# Patient Record
Sex: MALE | URBAN - METROPOLITAN AREA
[De-identification: names, ages, dates, MRNs, and addresses within clinical notes are randomized per-mention and may not be internally consistent; named-entity substitution may affect disease eponyms.]

---

## 2020-08-04 ENCOUNTER — HOSPITAL ENCOUNTER (OUTPATIENT)
Dept: SLEEP CENTER | Age: 40
Discharge: HOME | End: 2020-08-04
Payer: COMMERCIAL

## 2020-08-04 PROCEDURE — 94770 HC ETCO2 MONITOR DAILY: CPT

## 2020-08-04 PROCEDURE — 95810 POLYSOM 6/> YRS 4/> PARAM: CPT

## 2020-08-05 VITALS
BODY MASS INDEX: 34.3 KG/M2 | OXYGEN SATURATION: 95 % | HEART RATE: 58 BPM | WEIGHT: 245 LBS | SYSTOLIC BLOOD PRESSURE: 135 MMHG | HEIGHT: 71 IN | TEMPERATURE: 97.5 F | DIASTOLIC BLOOD PRESSURE: 83 MMHG

## 2020-08-05 ASSESSMENT — SLEEP AND FATIGUE QUESTIONNAIRES
HOW LIKELY ARE YOU TO NOD OFF OR FALL ASLEEP WHILE SITTING INACTIVE IN A PUBLIC PLACE: 1
HOW LIKELY ARE YOU TO NOD OFF OR FALL ASLEEP WHILE SITTING AND READING: 3
HOW LIKELY ARE YOU TO NOD OFF OR FALL ASLEEP WHILE WATCHING TV: 3
HOW LIKELY ARE YOU TO NOD OFF OR FALL ASLEEP WHEN YOU ARE A PASSENGER IN A CAR FOR AN HOUR WITHOUT A BREAK: 3
HOW LIKELY ARE YOU TO NOD OFF OR FALL ASLEEP WHILE SITTING AND TALKING TO SOMEONE: 1
HOW LIKELY ARE YOU TO NOD OFF OR FALL ASLEEP WHILE SITTING QUIETLY AFTER LUNCH WITHOUT ALCOHOL: 1
HOW LIKELY ARE YOU TO NOD OFF OR FALL ASLEEP IN A CAR, WHILE STOPPED FOR A FEW MINUTES IN TRAFFIC: 1
ESS TOTAL SCORE: 15
HOW LIKELY ARE YOU TO NOD OFF OR FALL ASLEEP WHILE LYING DOWN TO REST IN THE AFTERNOON WHEN CIRCUMSTANCES PERMIT: 2

## 2020-08-17 NOTE — PROGRESS NOTES
[id number] [address]                               SLEEP STUDY REPORT    PATIENT NAME: Mago Martinez                      :        1980  MED REC NO:   [id number]                            ROOM:  ACCOUNT NO:   [de-identified]                           ADMIT DATE: 2020  PROVIDER:     Sophie Pena    DATE OF STUDY:  2020    DIAGNOSTIC POLYSOMNOGRAM WITH END-TIDAL CO2 MONITORING    Nocturnal polysomnography was undertaken on the evening of 2020  through the morning of 2020 utilizing a standard six EEG sleep  apnea protocol. Additional recorded channels included chin EMG,  extraocular movements, snoring, pulse oximetry, electrocardiogram,  airflow at nose and mouth, respiratory effort at ribcage and abdomen,  end-tidal CO2, limb EMG, and body position was recorded with closed  circuit video monitoring. INDICATION FOR STUDY:  Snoring, restless sleep, morning headaches,  drowsy driving, trouble with memory and concentration, and obesity with  hypoventilation. [place] Sleepiness Scale is 15/24 and BMI is 34.2. Total recording time was 386 minutes. Total sleep time of 374 minutes  or 97% sleep efficiency. Sleep latency was markedly decreased at less  than 1 minute. Wake after sleep onset was normal at 11.5 minutes. REM  latency was normal at 73 minutes. Stage I sleep was slightly reduced at  3% of total sleep time. Stage II sleep was increased at 79% of total  sleep time. Delta sleep was normal at 14% of total sleep time and REM  stage sleep was reduced at 4% of total sleep time. RESPIRATORY DISTURBANCE ANALYSIS:  Hypopneas were determined with a 3%  AASM desaturation rule. There were 4 central apneas with mean duration  of 12 seconds and maximal duration of 13 seconds. There were 49  hypopneas with mean duration of 17 seconds and maximal duration of 35  seconds.   Apnea-hypopnea index was 8.5 per hour overall and 32.7 per  hour during REM sleep. PERIODIC LIMB MOVEMENT ANALYSIS:  There were 16 limb movements without  associated arousals for a limb movement index of 2.6 per hour. No PLM  series were observed. DESATURATION ANALYSIS:  There were 53 desaturations of 3% or more for a  desaturation index of 8.5 per hour. END-TIDAL CO2 ANALYSIS:  Mean end-tidal CO2 for this study was 27.5 with  minimum of 18 and maximum of 32. PULSE RATE ANALYSIS:  Mean pulse rate for this study was 51.1 with  minimum of 43 and maximum of 80. No significant ectopy was noted. SLEEP ARCHITECTURE:  Sleep latency was very short. Delta latency was  normal.  REM latency was normal and REM cycled as expected, but all REM  cycles were very short. There were 13 awakenings, all of which were  short. IMPRESSION:  1. Diagnostic polysomnogram is compatible with mild obstructive sleep  apnea. 2.  There was no evidence of active hypoventilation on the present  study. 3.  Mild-to-moderate desaturations were observed in association with  respiratory events. 4.  There is a relative paucity of REM sleep. Sleep is otherwise fairly  well consolidated. RECOMMENDATIONS:  1. CPAP/bilevel titration. 2.  Avoid driving when sleepy. 3.  Avoid use of alcohol, sedatives or narcotics, any of which may make  sleep apnea worse. 4.  Further weight loss is recommended.         Libia Yo      D: 08/16/2020 15:21:53       T: 08/17/2020 5:28:38     [email or web address]  Job#: [id number]     Doc#: [id number]    CC:

## 2020-11-20 DIAGNOSIS — G47.33 OBSTRUCTIVE SLEEP APNEA (ADULT) (PEDIATRIC): Primary | ICD-10-CM

## 2020-12-08 ENCOUNTER — HOSPITAL ENCOUNTER (OUTPATIENT)
Age: 40
Discharge: HOME | End: 2020-12-10
Payer: COMMERCIAL

## 2020-12-08 PROCEDURE — U0003 INFECTIOUS AGENT DETECTION BY NUCLEIC ACID (DNA OR RNA); SEVERE ACUTE RESPIRATORY SYNDROME CORONAVIRUS 2 (SARS-COV-2) (CORONAVIRUS DISEASE [COVID-19]), AMPLIFIED PROBE TECHNIQUE, MAKING USE OF HIGH THROUGHPUT TECHNOLOGIES AS DESCRIBED BY CMS-2020-01-R: HCPCS

## 2020-12-10 LAB
SARS-COV-2: NOT DETECTED
SOURCE: NORMAL

## 2020-12-13 ENCOUNTER — HOSPITAL ENCOUNTER (OUTPATIENT)
Dept: SLEEP CENTER | Age: 40
Discharge: HOME | End: 2020-12-13
Payer: COMMERCIAL

## 2020-12-13 PROCEDURE — 95811 POLYSOM 6/>YRS CPAP 4/> PARM: CPT

## 2020-12-14 VITALS
HEART RATE: 53 BPM | BODY MASS INDEX: 34.3 KG/M2 | DIASTOLIC BLOOD PRESSURE: 82 MMHG | OXYGEN SATURATION: 98 % | WEIGHT: 245 LBS | HEIGHT: 71 IN | SYSTOLIC BLOOD PRESSURE: 141 MMHG | TEMPERATURE: 97.6 F

## 2020-12-14 RX ORDER — OMEPRAZOLE 10 MG/1
10 CAPSULE, DELAYED RELEASE ORAL DAILY
COMMUNITY

## 2020-12-17 NOTE — PROGRESS NOTES
[address] [address]                               SLEEP STUDY REPORT    PATIENT NAME: Ham Barry                      :        1980  MED REC NO:   [id number]                            ROOM:  ACCOUNT NO:   [de-identified]                           ADMIT DATE: 2020  PROVIDER:     Manuel Hardy MD    DATE OF STUDY:  2020    CPAP TITRATION    This is a patient of Dr. Maame Taylor. AGE OF THE PATIENT:  42-year-old. Height 71 inches, weighs 245 pounds. BMI is 34.2. INDICATION:  The patient has a history of obstructive sleep apnea. Previous study was done on 2020 with an apnea-hypopnea index of  8.5. REM index of 32.7 with lowest saturation of 89%. The patient  scores 15/24 on the [place] Sleepiness Scale. Neck circumference is  15.5. REPORT IN DETAIL:  Nocturnal polysomnography was done utilizing a  standard six EEG sleep apnea protocol. Raw data was reviewed in detail. Total recording time was 261 minutes, out of which total sleep time was  333 minutes resulting in a sleep efficiency of 92%. The patient had a  sleep latency of 2.3 minutes and was awake for another 26 minutes after  sleep onset. The patient spent 5% in stage I sleep, 84% in stage II  sleep, there was no delta sleep and 11% in REM sleep. REM latency was  131 minuets. The patient had quick sleep onset and did have very good  sleep efficiency for this test.    SLEEP-DISORDERED BREATHING:  The patient had 32 events during the night,  which consisted of 6 central apneas, 3 obstructive apneas, and 1 mixed  apnea. This accounts for an apnea index of 1.8. The patient had 22  hypopneas for a hypopnea index of 4. Total apnea-hypopnea index was  6.1. These events lasted anywhere from 17 seconds to 33 seconds in  duration. REM index was elevated at 6.3.   This was with 3% desaturation. With 4% desaturation, apnea-hypopnea index was 4.2. There were 13 hypopneas on this study with the 3% rule. In the supine  position, index was 4.5, the patient slept in the supine position the  entire night. There were no periodic limb movements. There were 32  desaturations with a desaturation index of 5.3. O2 humble was 87% in  sleep. Oxygen level was above 90% for 98% of the night. Heart rate was  between 44 to 92 with a mean of 53. The patient was titrated from a  CPAP of 6 to a final pressure of 10. On this pressure of 10, the  patient did have 38 minutes of REM sleep and did have 5 central apneas,  which may be treatment-emergent apneas. Otherwise, only had 1  obstructive apnea on this final pressure. Saturations were above 88%. The patient did have at least three periods of REM during the study. The patient did not have any problems with the machine or the mask and  felt more awake than usual.    IMPRESSION:  The patient requires a large ResMed AirFit F10 full  facemask with EPR 3 at CPAP of 10 cm with heated humidity. There was no  bruxism. The patient had normal sinus rhythm and rare PVCs. The  patient tolerated the study well. HEART RATE:  The patient's heart rate was between 44 to 92 with a mean  of 53.         Lendon Cranker, MD      D: 12/16/2020 18:22:48       T: 12/17/2020 0:29:14     [email or web address]  Job#: [id number]     Doc#: [id number]    CC:

## 2024-09-27 PROBLEM — M15.9 GENERALIZED OSTEOARTHRITIS: Status: ACTIVE | Noted: 2023-02-27

## 2024-09-27 PROBLEM — G47.33 OBSTRUCTIVE SLEEP APNEA SYNDROME: Status: ACTIVE | Noted: 2020-10-20

## 2024-09-27 PROBLEM — G43.909 MIGRAINE: Status: ACTIVE | Noted: 2023-10-11

## 2024-11-14 ENCOUNTER — HOSPITAL ENCOUNTER (OUTPATIENT)
Dept: SLEEP CENTER | Age: 44
Discharge: HOME | End: 2024-11-14
Payer: COMMERCIAL

## 2024-11-14 VITALS
HEIGHT: 71 IN | BODY MASS INDEX: 38.08 KG/M2 | SYSTOLIC BLOOD PRESSURE: 124 MMHG | OXYGEN SATURATION: 95 % | WEIGHT: 272 LBS | HEART RATE: 56 BPM | DIASTOLIC BLOOD PRESSURE: 78 MMHG

## 2024-11-14 DIAGNOSIS — G47.33 OSA (OBSTRUCTIVE SLEEP APNEA): ICD-10-CM

## 2024-11-14 PROCEDURE — 95811 POLYSOM 6/>YRS CPAP 4/> PARM: CPT

## 2024-11-14 ASSESSMENT — SLEEP AND FATIGUE QUESTIONNAIRES
HOW LIKELY ARE YOU TO NOD OFF OR FALL ASLEEP WHILE LYING DOWN TO REST IN THE AFTERNOON WHEN CIRCUMSTANCES PERMIT: HIGH CHANCE OF DOZING
HOW LIKELY ARE YOU TO NOD OFF OR FALL ASLEEP WHILE SITTING AND READING: HIGH CHANCE OF DOZING
ESS TOTAL SCORE: 17
HOW LIKELY ARE YOU TO NOD OFF OR FALL ASLEEP WHILE SITTING AND TALKING TO SOMEONE: SLIGHT CHANCE OF DOZING
HOW LIKELY ARE YOU TO NOD OFF OR FALL ASLEEP WHILE SITTING QUIETLY AFTER LUNCH WITHOUT ALCOHOL: MODERATE CHANCE OF DOZING
HOW LIKELY ARE YOU TO NOD OFF OR FALL ASLEEP WHEN YOU ARE A PASSENGER IN A CAR FOR AN HOUR WITHOUT A BREAK: MODERATE CHANCE OF DOZING
HOW LIKELY ARE YOU TO NOD OFF OR FALL ASLEEP WHILE SITTING INACTIVE IN A PUBLIC PLACE: MODERATE CHANCE OF DOZING
HOW LIKELY ARE YOU TO NOD OFF OR FALL ASLEEP WHILE WATCHING TV: HIGH CHANCE OF DOZING

## 2024-12-20 ENCOUNTER — HOSPITAL ENCOUNTER (OUTPATIENT)
Dept: CT IMAGING | Age: 44
Discharge: HOME | End: 2024-12-22
Attending: INTERNAL MEDICINE
Payer: COMMERCIAL

## 2024-12-20 DIAGNOSIS — D86.9 SARCOIDOSIS: ICD-10-CM

## 2024-12-20 PROCEDURE — 71250 CT THORAX DX C-: CPT

## 2025-02-17 ENCOUNTER — ANESTHESIA EVENT (OUTPATIENT)
Dept: ENDOSCOPY | Age: 45
End: 2025-02-17
Payer: COMMERCIAL

## 2025-02-19 ENCOUNTER — HOSPITAL ENCOUNTER (OUTPATIENT)
Age: 45
Setting detail: OUTPATIENT SURGERY
Discharge: HOME | End: 2025-02-19
Attending: INTERNAL MEDICINE | Admitting: INTERNAL MEDICINE
Payer: COMMERCIAL

## 2025-02-19 ENCOUNTER — ANESTHESIA (OUTPATIENT)
Dept: ENDOSCOPY | Age: 45
End: 2025-02-19
Payer: COMMERCIAL

## 2025-02-19 ENCOUNTER — APPOINTMENT (OUTPATIENT)
Dept: GENERAL RADIOLOGY | Age: 45
End: 2025-02-19
Attending: INTERNAL MEDICINE
Payer: COMMERCIAL

## 2025-02-19 VITALS
DIASTOLIC BLOOD PRESSURE: 88 MMHG | BODY MASS INDEX: 39.34 KG/M2 | TEMPERATURE: 97.2 F | OXYGEN SATURATION: 94 % | WEIGHT: 281 LBS | SYSTOLIC BLOOD PRESSURE: 159 MMHG | HEART RATE: 80 BPM | RESPIRATION RATE: 20 BRPM | HEIGHT: 71 IN

## 2025-02-19 DIAGNOSIS — R59.0 MEDIASTINAL ADENOPATHY: ICD-10-CM

## 2025-02-19 PROCEDURE — 2580000003 HC RX 258

## 2025-02-19 PROCEDURE — 87116 MYCOBACTERIA CULTURE: CPT

## 2025-02-19 PROCEDURE — 88173 CYTOPATH EVAL FNA REPORT: CPT

## 2025-02-19 PROCEDURE — 7100000001 HC PACU RECOVERY - ADDTL 15 MIN: Performed by: INTERNAL MEDICINE

## 2025-02-19 PROCEDURE — 87070 CULTURE OTHR SPECIMN AEROBIC: CPT

## 2025-02-19 PROCEDURE — 2709999900 HC NON-CHARGEABLE SUPPLY: Performed by: INTERNAL MEDICINE

## 2025-02-19 PROCEDURE — 3609020000 HC BRONCHOSCOPY W/EBUS FNA 3/> NODE: Performed by: INTERNAL MEDICINE

## 2025-02-19 PROCEDURE — 7100000011 HC PHASE II RECOVERY - ADDTL 15 MIN: Performed by: INTERNAL MEDICINE

## 2025-02-19 PROCEDURE — 3700000001 HC ADD 15 MINUTES (ANESTHESIA): Performed by: INTERNAL MEDICINE

## 2025-02-19 PROCEDURE — 87015 SPECIMEN INFECT AGNT CONCNTJ: CPT

## 2025-02-19 PROCEDURE — 6360000002 HC RX W HCPCS

## 2025-02-19 PROCEDURE — 87206 SMEAR FLUORESCENT/ACID STAI: CPT

## 2025-02-19 PROCEDURE — 7100000010 HC PHASE II RECOVERY - FIRST 15 MIN: Performed by: INTERNAL MEDICINE

## 2025-02-19 PROCEDURE — 2720000010 HC SURG SUPPLY STERILE: Performed by: INTERNAL MEDICINE

## 2025-02-19 PROCEDURE — 7100000000 HC PACU RECOVERY - FIRST 15 MIN: Performed by: INTERNAL MEDICINE

## 2025-02-19 PROCEDURE — 3700000000 HC ANESTHESIA ATTENDED CARE: Performed by: INTERNAL MEDICINE

## 2025-02-19 PROCEDURE — C1725 CATH, TRANSLUMIN NON-LASER: HCPCS | Performed by: INTERNAL MEDICINE

## 2025-02-19 PROCEDURE — 71045 X-RAY EXAM CHEST 1 VIEW: CPT

## 2025-02-19 PROCEDURE — 87205 SMEAR GRAM STAIN: CPT

## 2025-02-19 PROCEDURE — 88172 CYTP DX EVAL FNA 1ST EA SITE: CPT

## 2025-02-19 PROCEDURE — 3609010800 HC BRONCHOSCOPY ALVEOLAR LAVAGE: Performed by: INTERNAL MEDICINE

## 2025-02-19 PROCEDURE — 2500000003 HC RX 250 WO HCPCS

## 2025-02-19 PROCEDURE — 89051 BODY FLUID CELL COUNT: CPT

## 2025-02-19 PROCEDURE — 87102 FUNGUS ISOLATION CULTURE: CPT

## 2025-02-19 PROCEDURE — 88312 SPECIAL STAINS GROUP 1: CPT

## 2025-02-19 PROCEDURE — 88305 TISSUE EXAM BY PATHOLOGIST: CPT

## 2025-02-19 RX ORDER — LABETALOL HYDROCHLORIDE 5 MG/ML
5 INJECTION, SOLUTION INTRAVENOUS
Status: DISCONTINUED | OUTPATIENT
Start: 2025-02-19 | End: 2025-02-19 | Stop reason: HOSPADM

## 2025-02-19 RX ORDER — LABETALOL HYDROCHLORIDE 5 MG/ML
INJECTION, SOLUTION INTRAVENOUS
Status: DISCONTINUED | OUTPATIENT
Start: 2025-02-19 | End: 2025-02-19 | Stop reason: SDUPTHER

## 2025-02-19 RX ORDER — ACETAMINOPHEN 325 MG/1
650 TABLET ORAL
Status: DISCONTINUED | OUTPATIENT
Start: 2025-02-19 | End: 2025-02-19 | Stop reason: HOSPADM

## 2025-02-19 RX ORDER — PROPOFOL 10 MG/ML
INJECTION, EMULSION INTRAVENOUS
Status: DISCONTINUED | OUTPATIENT
Start: 2025-02-19 | End: 2025-02-19 | Stop reason: SDUPTHER

## 2025-02-19 RX ORDER — DROPERIDOL 2.5 MG/ML
0.62 INJECTION, SOLUTION INTRAMUSCULAR; INTRAVENOUS
Status: DISCONTINUED | OUTPATIENT
Start: 2025-02-19 | End: 2025-02-19 | Stop reason: HOSPADM

## 2025-02-19 RX ORDER — SODIUM CHLORIDE 0.9 % (FLUSH) 0.9 %
5-40 SYRINGE (ML) INJECTION EVERY 12 HOURS SCHEDULED
Status: DISCONTINUED | OUTPATIENT
Start: 2025-02-19 | End: 2025-02-19 | Stop reason: HOSPADM

## 2025-02-19 RX ORDER — ONDANSETRON 2 MG/ML
INJECTION INTRAMUSCULAR; INTRAVENOUS
Status: DISCONTINUED | OUTPATIENT
Start: 2025-02-19 | End: 2025-02-19 | Stop reason: SDUPTHER

## 2025-02-19 RX ORDER — DIPHENHYDRAMINE HYDROCHLORIDE 50 MG/ML
12.5 INJECTION INTRAMUSCULAR; INTRAVENOUS
Status: DISCONTINUED | OUTPATIENT
Start: 2025-02-19 | End: 2025-02-19 | Stop reason: HOSPADM

## 2025-02-19 RX ORDER — FENTANYL CITRATE 50 UG/ML
INJECTION, SOLUTION INTRAMUSCULAR; INTRAVENOUS
Status: DISCONTINUED | OUTPATIENT
Start: 2025-02-19 | End: 2025-02-19 | Stop reason: SDUPTHER

## 2025-02-19 RX ORDER — ROCURONIUM BROMIDE 10 MG/ML
INJECTION, SOLUTION INTRAVENOUS
Status: DISCONTINUED | OUTPATIENT
Start: 2025-02-19 | End: 2025-02-19 | Stop reason: SDUPTHER

## 2025-02-19 RX ORDER — SODIUM CHLORIDE 0.9 % (FLUSH) 0.9 %
5-40 SYRINGE (ML) INJECTION PRN
Status: DISCONTINUED | OUTPATIENT
Start: 2025-02-19 | End: 2025-02-19 | Stop reason: HOSPADM

## 2025-02-19 RX ORDER — MEPERIDINE HYDROCHLORIDE 25 MG/ML
12.5 INJECTION INTRAMUSCULAR; INTRAVENOUS; SUBCUTANEOUS EVERY 5 MIN PRN
Status: DISCONTINUED | OUTPATIENT
Start: 2025-02-19 | End: 2025-02-19 | Stop reason: HOSPADM

## 2025-02-19 RX ORDER — MIDAZOLAM HYDROCHLORIDE 1 MG/ML
INJECTION, SOLUTION INTRAMUSCULAR; INTRAVENOUS
Status: DISCONTINUED | OUTPATIENT
Start: 2025-02-19 | End: 2025-02-19 | Stop reason: SDUPTHER

## 2025-02-19 RX ORDER — ONDANSETRON 2 MG/ML
4 INJECTION INTRAMUSCULAR; INTRAVENOUS
Status: DISCONTINUED | OUTPATIENT
Start: 2025-02-19 | End: 2025-02-19 | Stop reason: HOSPADM

## 2025-02-19 RX ORDER — HYDROMORPHONE HYDROCHLORIDE 1 MG/ML
0.5 INJECTION, SOLUTION INTRAMUSCULAR; INTRAVENOUS; SUBCUTANEOUS EVERY 5 MIN PRN
Status: DISCONTINUED | OUTPATIENT
Start: 2025-02-19 | End: 2025-02-19 | Stop reason: HOSPADM

## 2025-02-19 RX ORDER — NALOXONE HYDROCHLORIDE 0.4 MG/ML
INJECTION, SOLUTION INTRAMUSCULAR; INTRAVENOUS; SUBCUTANEOUS PRN
Status: DISCONTINUED | OUTPATIENT
Start: 2025-02-19 | End: 2025-02-19 | Stop reason: HOSPADM

## 2025-02-19 RX ORDER — HYDRALAZINE HYDROCHLORIDE 20 MG/ML
5 INJECTION INTRAMUSCULAR; INTRAVENOUS
Status: DISCONTINUED | OUTPATIENT
Start: 2025-02-19 | End: 2025-02-19 | Stop reason: HOSPADM

## 2025-02-19 RX ORDER — IPRATROPIUM BROMIDE AND ALBUTEROL SULFATE 2.5; .5 MG/3ML; MG/3ML
1 SOLUTION RESPIRATORY (INHALATION)
Status: DISCONTINUED | OUTPATIENT
Start: 2025-02-19 | End: 2025-02-19 | Stop reason: HOSPADM

## 2025-02-19 RX ORDER — DEXAMETHASONE SODIUM PHOSPHATE 10 MG/ML
INJECTION, SOLUTION INTRA-ARTICULAR; INTRALESIONAL; INTRAMUSCULAR; INTRAVENOUS; SOFT TISSUE
Status: DISCONTINUED | OUTPATIENT
Start: 2025-02-19 | End: 2025-02-19 | Stop reason: SDUPTHER

## 2025-02-19 RX ORDER — MIDAZOLAM HYDROCHLORIDE 2 MG/2ML
2 INJECTION, SOLUTION INTRAMUSCULAR; INTRAVENOUS
Status: DISCONTINUED | OUTPATIENT
Start: 2025-02-19 | End: 2025-02-19 | Stop reason: HOSPADM

## 2025-02-19 RX ORDER — SODIUM CHLORIDE 9 MG/ML
INJECTION, SOLUTION INTRAVENOUS
Status: DISCONTINUED | OUTPATIENT
Start: 2025-02-19 | End: 2025-02-19 | Stop reason: SDUPTHER

## 2025-02-19 RX ORDER — HYDROMORPHONE HYDROCHLORIDE 1 MG/ML
0.25 INJECTION, SOLUTION INTRAMUSCULAR; INTRAVENOUS; SUBCUTANEOUS EVERY 5 MIN PRN
Status: DISCONTINUED | OUTPATIENT
Start: 2025-02-19 | End: 2025-02-19 | Stop reason: HOSPADM

## 2025-02-19 RX ORDER — SODIUM CHLORIDE 9 MG/ML
INJECTION, SOLUTION INTRAVENOUS PRN
Status: DISCONTINUED | OUTPATIENT
Start: 2025-02-19 | End: 2025-02-19 | Stop reason: HOSPADM

## 2025-02-19 RX ADMIN — MIDAZOLAM 2 MG: 1 INJECTION INTRAMUSCULAR; INTRAVENOUS at 11:15

## 2025-02-19 RX ADMIN — LABETALOL HYDROCHLORIDE 5 MG: 5 INJECTION INTRAVENOUS at 11:25

## 2025-02-19 RX ADMIN — ROCURONIUM BROMIDE 10 MG: 10 INJECTION, SOLUTION INTRAVENOUS at 11:32

## 2025-02-19 RX ADMIN — FENTANYL CITRATE 25 MCG: 0.05 INJECTION, SOLUTION INTRAMUSCULAR; INTRAVENOUS at 12:20

## 2025-02-19 RX ADMIN — FENTANYL CITRATE 100 MCG: 0.05 INJECTION, SOLUTION INTRAMUSCULAR; INTRAVENOUS at 11:15

## 2025-02-19 RX ADMIN — DEXAMETHASONE SODIUM PHOSPHATE 10 MG: 10 INJECTION INTRAMUSCULAR; INTRAVENOUS at 11:41

## 2025-02-19 RX ADMIN — ROCURONIUM BROMIDE 10 MG: 10 INJECTION, SOLUTION INTRAVENOUS at 11:52

## 2025-02-19 RX ADMIN — ROCURONIUM BROMIDE 40 MG: 10 INJECTION, SOLUTION INTRAVENOUS at 11:15

## 2025-02-19 RX ADMIN — SODIUM CHLORIDE: 9 INJECTION, SOLUTION INTRAVENOUS at 11:13

## 2025-02-19 RX ADMIN — FENTANYL CITRATE 50 MCG: 0.05 INJECTION, SOLUTION INTRAMUSCULAR; INTRAVENOUS at 11:37

## 2025-02-19 RX ADMIN — ONDANSETRON 4 MG: 2 INJECTION, SOLUTION INTRAMUSCULAR; INTRAVENOUS at 11:41

## 2025-02-19 RX ADMIN — SUGAMMADEX 255 MG: 100 INJECTION, SOLUTION INTRAVENOUS at 12:07

## 2025-02-19 RX ADMIN — FENTANYL CITRATE 50 MCG: 0.05 INJECTION, SOLUTION INTRAMUSCULAR; INTRAVENOUS at 11:25

## 2025-02-19 RX ADMIN — PROPOFOL 200 MG: 10 INJECTION, EMULSION INTRAVENOUS at 11:15

## 2025-02-19 ASSESSMENT — ENCOUNTER SYMPTOMS
COUGH: 1
ABDOMINAL PAIN: 0
COLOR CHANGE: 0
NAUSEA: 0
VOMITING: 0
EYES NEGATIVE: 1

## 2025-02-19 ASSESSMENT — PAIN - FUNCTIONAL ASSESSMENT
PAIN_FUNCTIONAL_ASSESSMENT: 0-10
PAIN_FUNCTIONAL_ASSESSMENT: 0-10

## 2025-02-19 ASSESSMENT — PAIN SCALES - GENERAL: PAINLEVEL_OUTOF10: 0

## 2025-02-19 NOTE — PROCEDURES
Bronchoscopy Procedure Note  Procedure Date: 2/19/25   Procedure:  Flexible bronchoscopy with EBUS  Location:  [facility]  Attending: Dr. Talley  Anesthesia: General see, record    Indications: abnormal CT with mediastinal adenopathy    Findings:  1.  Few scattered thick secretions  2.  Enlarged station #7 and 10R lymph nodes    Procedures Performed:  Video bronchoscopy airway survey  BAL right upper lobe  Transbronchial biopsies of right upper lobe fluoroscopic guidance  EBUS survey with TBNA of station #7 and 10R lymph nodes      Consent:  The risks, benefits, indications, potential complications and alternatives were explained and informed consent obtained on 2/19/25     Description of Procedure:  Bronchoscopy formed in [facility] endoscopy suite for abnormal CT with mediastinal adenopathy and tree-in-bud opacities.  Patient had general anesthesia, see anesthesia record.  Intubated with 9.0 endotracheal tube does cut down with Portex adapter attached.  Timeout called prior to procedure and all agreed this proper patient and procedure.  Bronchoscope introduced through endotracheal tube to the trachea the main mayra visualized.  Airway survey performed RB 1-10 LB 1-10.  There were few scattered thick secretions.  BAL was performed of the right upper lobe.  Under fluoroscopic guidance transbronchial biopsies performed of the right upper lobe.  Standard scope removed and exchanged for EBUS scope and survey performed. There was enlarged station #7 and 10R lymph nodes. Using EBUS transbronchial needle aspiration was performed of station # 7 and 10R lymph nodes.  Final survey revealed no active bleeding bronchoscope removed from tracheobronchial tree.      Plan:  Pending tissue assessment and microbiology

## 2025-02-19 NOTE — ANESTHESIA PRE PROCEDURE
Department of Anesthesiology  Preprocedure Note       Name:  Chidi Kang   Age:  44 y.o.  :  1980                                          MRN:  [id number]         Date:  2025      Surgeon: Surgeon(s):  Chirag Talley DO    Procedure: Procedure(s):  BRONCHOSCOPY ENDOBRONCHIAL ULTRASOUND    Medications prior to admission:   Prior to Admission medications    Medication Sig Start Date End Date Taking? Authorizing Provider   cyclobenzaprine (FLEXERIL) 10 MG tablet Take 1 tablet by mouth as needed   Yes Nimesh Calix MD   famotidine (PEPCID) 20 MG tablet Take 1 tablet by mouth nightly 24  Yes Nimesh Calix MD   omeprazole (PRILOSEC) 10 MG delayed release capsule Take 1 capsule by mouth every morning   Yes Nimesh Calix MD   vitamin D 25 MCG (1000 UT) CAPS Take 2 capsules by mouth 2 times daily    Nimesh Calix MD   loratadine (CLARITIN) 10 MG tablet Take 1 tablet by mouth daily 24   ProviderNimesh MD       Current medications:    Current Facility-Administered Medications   Medication Dose Route Frequency Provider Last Rate Last Admin   • sodium chloride flush 0.9 % injection 5-40 mL  5-40 mL IntraVENous PRN Chirag Talley DO           Allergies:    Allergies   Allergen Reactions   • Penicillins Hives   • Seasonal        Problem List:    Patient Active Problem List   Diagnosis Code   • Generalized osteoarthritis M15.9   • Migraine G43.909   • Obstructive sleep apnea syndrome G47.33       Past Medical History:        Diagnosis Date   • Hypertension    • Sarcoidosis     will know after after bronch       Past Surgical History:        Procedure Laterality Date   • JOINT REPLACEMENT Bilateral     Hip approx  and    • ROTATOR CUFF REPAIR Left      approx   • SHOULDER SURGERY Right     AC joint was shaved    • VOCAL CORD SURGERY      Removal polyps        Social History:    Social History     Tobacco Use   • Smoking status: Former     Current

## 2025-02-19 NOTE — H&P
Xavier Coburn M.D.  Jay Pearce D.O.  Charmaine Muse M.D.  Viktoriya Trivedi M.D.  Chirag Talley D.O.  Poncho Hdez M.D.       Patient:  Chidi Kang 44 y.o. male MRN: [id number]     Date of Service: 2/19/2025      H&P/PULMONARY CONSULTATION      No chief complaint on file.        Code Status: Full Code        SUBJECTIVE:    HPI:  This is a 44-year-old male that presents for outpatient bronchoscopy with EBUS.  Patient with persistently abnormal CT showing mediastinal adenopathy with tree-in-bud opacities.  Has intermittent coughing episodes.  Does have chronic sinus congestion drainage, history of vocal polyp s/p surgery.  VIVIANE previously on CPAP.      Past Medical History:   Diagnosis Date    Hypertension     Sarcoidosis     will know after after bronch     Past Surgical History:   Procedure Laterality Date    JOINT REPLACEMENT Bilateral     Hip approx 2021 and 2022    ROTATOR CUFF REPAIR Left     2016 approx    SHOULDER SURGERY Right     AC joint was shaved 2001    VOCAL CORD SURGERY      Removal polyps 2008     History reviewed. No pertinent family history.      Social History:   Social History     Socioeconomic History    Marital status: Unknown     Spouse name: Not on file    Number of children: Not on file    Years of education: Not on file    Highest education level: Not on file   Occupational History    Not on file   Tobacco Use    Smoking status: Former     Current packs/day: 0.01     Average packs/day: (0.1 ttl pk-yrs)     Types: Cigarettes     Start date: 2016     Quit date: 2006    Smokeless tobacco: Former     Types: Chew    Tobacco comments:     Patient used chew for about a year ten years ago as of 9/2024.    Vaping Use    Vaping status: Never Used    Passive vaping exposure: Yes   Substance and Sexual Activity    Alcohol use: Not Currently     Comment: OCC.    Drug use: Never    Sexual activity: Not Currently   Other Topics Concern    Not on file   Social History Narrative    Not on file

## 2025-02-20 LAB
APPEARANCE BRONCH: CLEAR
BASOPHILS # BLD: 0 %
CLOT CHECK: ABNORMAL
COLOR BRONCH: COLORLESS
EOSINOPHILS BAL: 2 % (ref 0–1)
LYMPHOCYTES, BAL: 25 % (ref 8–12)
MACROPHAGES, BAL: 2 % (ref 85–95)
MICROORGANISM SPEC CULT: NORMAL
MICROORGANISM/AGENT SPEC: NORMAL
RBC, BAL: 21 CELLS/UL
SEGMENTED NEUTROPHILS, BAL: 71 % (ref 0–10)
SERVICE CMNT-IMP: NORMAL
SPECIMEN DESCRIPTION: NORMAL
SPECIMEN TYPE: ABNORMAL
TOTAL CELLS COUNTED BRONCH: 35 CELLS/UL
UNIDENT CELLS NFR FLD: ABNORMAL %

## 2025-02-20 NOTE — ANESTHESIA POSTPROCEDURE EVALUATION
Department of Anesthesiology  Postprocedure Note    Patient: Chidi Kang  MRN: [id number]  YOB: 1980  Date of evaluation: 2/20/2025    Procedure Summary       Date: 02/19/25 Room / Location: [place] [address] / [facility]    Anesthesia Start: 1106 Anesthesia Stop: 1232    Procedures:       BRONCHOSCOPY ENDOBRONCHIAL ULTRASOUND FINE NEEDLE ASPIRATION      BRONCHOSCOPY ALVEOLAR LAVAGE Diagnosis:       Mediastinal adenopathy      (Mediastinal adenopathy [R59.0])    Surgeons: Chirag Talley DO Responsible Provider: Esmer Castaneda MD    Anesthesia Type: general ASA Status: 3            Anesthesia Type: No value filed.    Burton Phase I: Burton Score: 10    Burton Phase II: Burton Score: 10    Anesthesia Post Evaluation    Patient location during evaluation: PACU  Patient participation: complete - patient participated  Level of consciousness: awake and alert  Airway patency: patent  Nausea & Vomiting: no nausea and no vomiting  Cardiovascular status: blood pressure returned to baseline and hemodynamically stable  Respiratory status: acceptable and spontaneous ventilation  Hydration status: euvolemic  Multimodal analgesia pain management approach  Pain management: adequate    No notable events documented.

## 2025-02-22 LAB
MICROORGANISM SPEC CULT: NORMAL
MICROORGANISM/AGENT SPEC: NORMAL
SEND OUT REPORT: NORMAL
SERVICE CMNT-IMP: NORMAL
SPECIMEN DESCRIPTION: NORMAL
TEST NAME: NORMAL

## 2025-02-23 LAB
MICROORGANISM SPEC CULT: NORMAL
MICROORGANISM/AGENT SPEC: NORMAL
SERVICE CMNT-IMP: NORMAL
SPECIMEN DESCRIPTION: NORMAL

## 2025-02-24 LAB — NON-GYN CYTOLOGY REPORT: NORMAL

## 2025-02-25 LAB — SURGICAL PATHOLOGY REPORT: NORMAL

## 2025-02-27 ENCOUNTER — TELEPHONE (OUTPATIENT)
Dept: PULMONOLOGY | Age: 45
End: 2025-02-27

## 2025-02-27 DIAGNOSIS — D86.9 SARCOIDOSIS: Primary | ICD-10-CM

## 2025-02-27 LAB
SEND OUT REPORT: NORMAL
TEST NAME: NORMAL

## 2025-02-27 NOTE — TELEPHONE ENCOUNTER
Made call attempts to patient to go over bronchoscopy results with no answer, left voicemail.  Tried to call spouse with no answer.  Bronchoscopy consistent with sarcoidosis.  If patient calls I recommend that we start him on prednisone 20 mg daily.  He should continue this until he sees me at next office visit scheduled on 3/26.  Plan would be to have slow taper prednisone to help with the symptoms.    Thanks,  RT

## 2025-03-21 LAB
MICROORGANISM SPEC CULT: NORMAL
MICROORGANISM SPEC CULT: NORMAL
MICROORGANISM/AGENT SPEC: NORMAL
MICROORGANISM/AGENT SPEC: NORMAL
SERVICE CMNT-IMP: NORMAL
SERVICE CMNT-IMP: NORMAL
SPECIMEN DESCRIPTION: NORMAL
SPECIMEN DESCRIPTION: NORMAL

## 2025-03-26 PROBLEM — G47.30 SLEEP APNEA: Status: ACTIVE | Noted: 2020-10-20

## 2025-05-28 PROBLEM — D86.0 SARCOIDOSIS OF LUNG: Status: ACTIVE | Noted: 2025-02-27

## 2025-07-22 ENCOUNTER — HOSPITAL ENCOUNTER (OUTPATIENT)
Dept: PULMONOLOGY | Age: 45
Discharge: HOME | End: 2025-07-22
Attending: INTERNAL MEDICINE
Payer: COMMERCIAL

## 2025-07-22 DIAGNOSIS — D86.0 SARCOIDOSIS OF LUNG: ICD-10-CM

## 2025-07-22 PROCEDURE — 94060 EVALUATION OF WHEEZING: CPT

## 2025-07-22 PROCEDURE — 94726 PLETHYSMOGRAPHY LUNG VOLUMES: CPT

## 2025-07-22 PROCEDURE — 94729 DIFFUSING CAPACITY: CPT

## (undated) DEVICE — CONTAINER SPEC 60ML PH 7NEUTRAL BUFF FRMLN RDY TO USE

## (undated) DEVICE — NEEDLE ASPIR 700X2 MM DISP

## (undated) DEVICE — Device: Brand: MEDEX

## (undated) DEVICE — SINGLE USE BIOPSY VALVE MAJ-210: Brand: SINGLE USE BIOPSY VALVE (STERILE)

## (undated) DEVICE — AIR/WATER CLEANING ADAPTER FOR OLYMPUS® GI ENDOSCOPE: Brand: BULLDOG®

## (undated) DEVICE — SET EXTN IV L30IN TBNG DIA0.1IN PRIMING 4ML MACBOR FEM ADPT

## (undated) DEVICE — Device: Brand: BALLOON

## (undated) DEVICE — BRONCHOSCOPY PACK: Brand: MEDLINE INDUSTRIES, INC.

## (undated) DEVICE — ADAPTER TBNG DIA15MM SWVL FBROPT BRONCHSCP TERM 2 AXIS PEEP

## (undated) DEVICE — SYRINGE MED 50ML LUERLOCK TIP

## (undated) DEVICE — ENDOSCOPIC KIT 1.1+ OP4 NO CP DE

## (undated) DEVICE — SINGLE USE SUCTION VALVE MAJ-209: Brand: SINGLE USE SUCTION VALVE (STERILE)